# Patient Record
Sex: FEMALE | URBAN - METROPOLITAN AREA
[De-identification: names, ages, dates, MRNs, and addresses within clinical notes are randomized per-mention and may not be internally consistent; named-entity substitution may affect disease eponyms.]

---

## 2020-02-27 ENCOUNTER — TELEPHONE (OUTPATIENT)
Dept: PODIATRY | Facility: CLINIC | Age: 70
End: 2020-02-27

## 2020-02-27 NOTE — TELEPHONE ENCOUNTER
LVM that messages were left for pt to call and schedule appt not return call after 3 messages were left

## 2020-02-27 NOTE — TELEPHONE ENCOUNTER
----- Message from Bailee Nino sent at 2/27/2020 12:35 PM CST -----  Contact: Nurse from [facility]  Type: Needs Medical Advice    Who Called:      Best Call Back Number:   Additional Information: Requesting a call back from Nurse, regarding referral that was faxed over on 02/06/20 please call Nurse back